# Patient Record
Sex: MALE | Race: WHITE
[De-identification: names, ages, dates, MRNs, and addresses within clinical notes are randomized per-mention and may not be internally consistent; named-entity substitution may affect disease eponyms.]

---

## 2019-12-23 ENCOUNTER — HOSPITAL ENCOUNTER (OUTPATIENT)
Dept: HOSPITAL 62 - OD | Age: 34
End: 2019-12-23
Attending: FAMILY MEDICINE
Payer: COMMERCIAL

## 2019-12-23 DIAGNOSIS — M10.9: Primary | ICD-10-CM

## 2019-12-23 LAB
ABSOLUTE LYMPHOCYTES# (MANUAL): 0.8 10^3/UL (ref 0.5–4.7)
ABSOLUTE MONOCYTES # (MANUAL): 0.5 10^3/UL (ref 0.1–1.4)
ADD MANUAL DIFF: YES
ALBUMIN SERPL-MCNC: 4.2 G/DL (ref 3.5–5)
ALP SERPL-CCNC: 71 U/L (ref 38–126)
ANION GAP SERPL CALC-SCNC: 9 MMOL/L (ref 5–19)
ANISOCYTOSIS BLD QL SMEAR: SLIGHT
AST SERPL-CCNC: 160 U/L (ref 17–59)
BASOPHILS NFR BLD MANUAL: 0 % (ref 0–2)
BILIRUB DIRECT SERPL-MCNC: 0.7 MG/DL (ref 0–0.4)
BILIRUB SERPL-MCNC: 5.3 MG/DL (ref 0.2–1.3)
BUN SERPL-MCNC: 7 MG/DL (ref 7–20)
CALCIUM: 9.4 MG/DL (ref 8.4–10.2)
CHLORIDE SERPL-SCNC: 99 MMOL/L (ref 98–107)
CHOLEST SERPL-MCNC: 223.92 MG/DL (ref 0–200)
CO2 SERPL-SCNC: 31 MMOL/L (ref 22–30)
EOSINOPHIL NFR BLD MANUAL: 2 % (ref 0–6)
ERYTHROCYTE [DISTWIDTH] IN BLOOD BY AUTOMATED COUNT: 15 % (ref 11.5–14)
GLUCOSE SERPL-MCNC: 110 MG/DL (ref 75–110)
HCT VFR BLD CALC: 58.3 % (ref 37.9–51)
HGB BLD-MCNC: 20.5 G/DL (ref 13.5–17)
LDLC SERPL DIRECT ASSAY-MCNC: 182 MG/DL (ref ?–100)
MACROCYTES BLD QL SMEAR: (no result)
MCH RBC QN AUTO: 36.1 PG (ref 27–33.4)
MCHC RBC AUTO-ENTMCNC: 35.2 G/DL (ref 32–36)
MCV RBC AUTO: 103 FL (ref 80–97)
MONOCYTES % (MANUAL): 9 % (ref 3–13)
PLATELET # BLD: 152 10^3/UL (ref 150–450)
PLATELET COMMENT: ADEQUATE
POTASSIUM SERPL-SCNC: 3.9 MMOL/L (ref 3.6–5)
PROT SERPL-MCNC: 7.5 G/DL (ref 6.3–8.2)
RBC # BLD AUTO: 5.68 10^6/UL (ref 4.35–5.55)
SEGMENTED NEUTROPHILS % (MAN): 74 % (ref 42–78)
TOTAL CELLS COUNTED BLD: 100
TRIGL SERPL-MCNC: 185 MG/DL (ref ?–150)
URATE SERPL-MCNC: 10.3 MG/DL (ref 3.5–8.5)
VARIANT LYMPHS NFR BLD MANUAL: 15 % (ref 13–45)
VLDLC SERPL CALC-MCNC: 37 MG/DL (ref 10–31)
WBC # BLD AUTO: 5.6 10^3/UL (ref 4–10.5)

## 2019-12-23 PROCEDURE — 84550 ASSAY OF BLOOD/URIC ACID: CPT

## 2019-12-23 PROCEDURE — 80053 COMPREHEN METABOLIC PANEL: CPT

## 2019-12-23 PROCEDURE — 85025 COMPLETE CBC W/AUTO DIFF WBC: CPT

## 2019-12-23 PROCEDURE — 80061 LIPID PANEL: CPT

## 2019-12-23 PROCEDURE — 36415 COLL VENOUS BLD VENIPUNCTURE: CPT

## 2020-03-02 ENCOUNTER — HOSPITAL ENCOUNTER (OUTPATIENT)
Dept: HOSPITAL 62 - RAD | Age: 35
Discharge: HOME | End: 2020-03-02
Attending: INTERNAL MEDICINE
Payer: COMMERCIAL

## 2020-03-02 VITALS — DIASTOLIC BLOOD PRESSURE: 89 MMHG | SYSTOLIC BLOOD PRESSURE: 139 MMHG

## 2020-03-02 DIAGNOSIS — K75.81: ICD-10-CM

## 2020-03-02 DIAGNOSIS — Z79.899: ICD-10-CM

## 2020-03-02 DIAGNOSIS — E83.119: Primary | ICD-10-CM

## 2020-03-02 LAB
APTT BLD: 28.9 SEC (ref 23.5–35.8)
BUN SERPL-MCNC: 8 MG/DL (ref 7–20)
ERYTHROCYTE [DISTWIDTH] IN BLOOD BY AUTOMATED COUNT: 15.4 % (ref 11.5–14)
HCT VFR BLD CALC: 46.5 % (ref 37.9–51)
HGB BLD-MCNC: 16.4 G/DL (ref 13.5–17)
INR PPP: 1.15
MCH RBC QN AUTO: 37.6 PG (ref 27–33.4)
MCHC RBC AUTO-ENTMCNC: 35.4 G/DL (ref 32–36)
MCV RBC AUTO: 106 FL (ref 80–97)
PLATELET # BLD: 242 10^3/UL (ref 150–450)
PROTHROMBIN TIME: 14.8 SEC (ref 11.4–15.4)
RBC # BLD AUTO: 4.38 10^6/UL (ref 4.35–5.55)
WBC # BLD AUTO: 6.7 10^3/UL (ref 4–10.5)

## 2020-03-02 PROCEDURE — 77012 CT SCAN FOR NEEDLE BIOPSY: CPT

## 2020-03-02 PROCEDURE — 88305 TISSUE EXAM BY PATHOLOGIST: CPT

## 2020-03-02 PROCEDURE — 82565 ASSAY OF CREATININE: CPT

## 2020-03-02 PROCEDURE — 85027 COMPLETE CBC AUTOMATED: CPT

## 2020-03-02 PROCEDURE — 88313 SPECIAL STAINS GROUP 2: CPT

## 2020-03-02 PROCEDURE — 85730 THROMBOPLASTIN TIME PARTIAL: CPT

## 2020-03-02 PROCEDURE — 36415 COLL VENOUS BLD VENIPUNCTURE: CPT

## 2020-03-02 PROCEDURE — 47000 NEEDLE BIOPSY OF LIVER PERQ: CPT

## 2020-03-02 PROCEDURE — 85610 PROTHROMBIN TIME: CPT

## 2020-03-02 PROCEDURE — 88307 TISSUE EXAM BY PATHOLOGIST: CPT

## 2020-03-02 PROCEDURE — 84520 ASSAY OF UREA NITROGEN: CPT

## 2020-03-02 NOTE — RADIOLOGY REPORT (SQ)
EXAM DESCRIPTION:  CT BIOPSY LIVER; CT NEEDLE PLACEMENT



COMPLETED DATE/TIME:  3/2/2020 11:51 am; 3/2/2020 11:50 am



REASON FOR STUDY:  HEMOCHROMATOSIS; HEMOCHROMATOSIS, LIVER BIOPSY E83.119  HEMOCHROMATOSIS, UNSPECIFI
ED



COMPARISON:  None.



FLUORO TIME:  3.4 seconds.

73 images submitted to PACS.



LIMITATIONS:  None.



PROCEDURE:  The procedure, risks, benefits, and alternatives were discussed with the patient in the p
reprocedural area, and all questions were answered. Informed consent was obtained verbally and in wri
ting.

The patient was then brought to the CT suite, positioned supine on the CT gurney, and a time-out was 
performed.  After that, axial images of the abdomen were obtained for targeting of the right lobe of 
the liver.  Based on review of the axial images an appropriate access site was selected on the skin.

The area around selected access site was then prepped and draped with 2% chlorhexidine utilizing annie
dard sterile technique.  After that, the access site was infiltrated with 1% lidocaine and an incisio
n was made in the skin with a #11 blade. A 17 gauge coaxial needle was then advanced through the skin
 incision and into the right lobe of the liver utilizing CT fluoroscopic guidance. After that, the in
ner stylet of the coaxial needle was removed and 4 18 gauge core samples were obtained - the samples 
were collected and submitted to cytopathology in formalin.

Then, as coaxial needle was removed, the biopsy track was embolized with Gelfoam.  After the coaxial 
needle was removed axial images of the abdomen were repeated and reviewed ; the images demonstrated n
o acute biopsy-related complication.

The patient tolerated the procedure well without immediate complication.

At the end of the procedure the patient's condition was unchanged from the preprocedural baseline.

IV conscious sedation was administered at the direction of the performing physician by a registered valeria cordero. 1 milligrams of Versed and 50 micrograms of fentanyl. Physiologic monitoring was provided befor
e, during, and after sedation. The total sedation time was 30 minutes.

Documentation of face-to-face time the performing proceduralist spent monitoring the patient:  5 dylan
adi.



IMPRESSION:  Successful CT-guided biopsy of the right lobe of the liver as detailed above.



COMMENT:  Patient medication list reviewed:Yes- Quality ID# 130:Eligible professional attests to docu
menting in the medical record they obtained, updated, or reviewed the patient's current medications.

Quality ID #76: The patient was prepped and draped using maximum sterile barrier technique including 
cap, mask, sterile gown, sterile gloves, a large sterile sheet, hand hygiene, and 2% Chlorhexidine fo
r cutaneous antisepsis. When ultrasound is used, sterile ultrasound techniques are followed requiring
 sterile gel and sterile probes.

Quality :  Final reports for procedures using fluoroscopy that document radiation exposure miriam
thea, or exposure time and number of fluorographic images (if radiation exposure indices are not avail
able)

Quality ID# 436: Final reports with documentation of one or more dose reduction techniques (e.g., Aut
omated exposure control, adjustment of the mA and/or kV according to patient size, use of iterative r
econstruction technique)



TECHNICAL DOCUMENTATION:  JOB ID:  [id number]

 2011 [employer]- All Rights Reserved                           rev-4/18



Reading location - IP/workstation name: [email or web address]

## 2020-03-02 NOTE — RADIOLOGY REPORT (SQ)
EXAM DESCRIPTION:  CT BIOPSY LIVER; CT NEEDLE PLACEMENT



COMPLETED DATE/TIME:  3/2/2020 11:51 am; 3/2/2020 11:50 am



REASON FOR STUDY:  HEMOCHROMATOSIS; HEMOCHROMATOSIS, LIVER BIOPSY E83.119  HEMOCHROMATOSIS, UNSPECIFI
ED



COMPARISON:  None.



FLUORO TIME:  3.4 seconds.

73 images submitted to PACS.



LIMITATIONS:  None.



PROCEDURE:  The procedure, risks, benefits, and alternatives were discussed with the patient in the p
reprocedural area, and all questions were answered. Informed consent was obtained verbally and in wri
ting.

The patient was then brought to the CT suite, positioned supine on the CT gurney, and a time-out was 
performed.  After that, axial images of the abdomen were obtained for targeting of the right lobe of 
the liver.  Based on review of the axial images an appropriate access site was selected on the skin.

The area around selected access site was then prepped and draped with 2% chlorhexidine utilizing annie
dard sterile technique.  After that, the access site was infiltrated with 1% lidocaine and an incisio
n was made in the skin with a #11 blade. A 17 gauge coaxial needle was then advanced through the skin
 incision and into the right lobe of the liver utilizing CT fluoroscopic guidance. After that, the in
ner stylet of the coaxial needle was removed and 4 18 gauge core samples were obtained - the samples 
were collected and submitted to cytopathology in formalin.

Then, as coaxial needle was removed, the biopsy track was embolized with Gelfoam.  After the coaxial 
needle was removed axial images of the abdomen were repeated and reviewed ; the images demonstrated n
o acute biopsy-related complication.

The patient tolerated the procedure well without immediate complication.

At the end of the procedure the patient's condition was unchanged from the preprocedural baseline.

IV conscious sedation was administered at the direction of the performing physician by a registered valeria cordero. 1 milligrams of Versed and 50 micrograms of fentanyl. Physiologic monitoring was provided befor
e, during, and after sedation. The total sedation time was 30 minutes.

Documentation of face-to-face time the performing proceduralist spent monitoring the patient:  5 dylan
adi.



IMPRESSION:  Successful CT-guided biopsy of the right lobe of the liver as detailed above.



COMMENT:  Patient medication list reviewed:Yes- Quality ID# 130:Eligible professional attests to docu
menting in the medical record they obtained, updated, or reviewed the patient's current medications.

Quality ID #76: The patient was prepped and draped using maximum sterile barrier technique including 
cap, mask, sterile gown, sterile gloves, a large sterile sheet, hand hygiene, and 2% Chlorhexidine fo
r cutaneous antisepsis. When ultrasound is used, sterile ultrasound techniques are followed requiring
 sterile gel and sterile probes.

Quality :  Final reports for procedures using fluoroscopy that document radiation exposure miriam
thea, or exposure time and number of fluorographic images (if radiation exposure indices are not avail
able)

Quality ID# 436: Final reports with documentation of one or more dose reduction techniques (e.g., Aut
omated exposure control, adjustment of the mA and/or kV according to patient size, use of iterative r
econstruction technique)



TECHNICAL DOCUMENTATION:  JOB ID:  [id number]

 2011 [employer]- All Rights Reserved                           rev-4/18



Reading location - IP/workstation name: [email or web address]